# Patient Record
Sex: FEMALE | Race: ASIAN | NOT HISPANIC OR LATINO
[De-identification: names, ages, dates, MRNs, and addresses within clinical notes are randomized per-mention and may not be internally consistent; named-entity substitution may affect disease eponyms.]

---

## 2020-08-06 ENCOUNTER — NON-APPOINTMENT (OUTPATIENT)
Age: 40
End: 2020-08-06

## 2020-08-06 ENCOUNTER — APPOINTMENT (OUTPATIENT)
Dept: OBGYN | Facility: CLINIC | Age: 40
End: 2020-08-06
Payer: MEDICAID

## 2020-08-06 VITALS
WEIGHT: 178 LBS | HEART RATE: 98 BPM | SYSTOLIC BLOOD PRESSURE: 115 MMHG | DIASTOLIC BLOOD PRESSURE: 80 MMHG | BODY MASS INDEX: 32.76 KG/M2 | HEIGHT: 62 IN | TEMPERATURE: 98 F

## 2020-08-06 VITALS
BODY MASS INDEX: 32.76 KG/M2 | TEMPERATURE: 98 F | DIASTOLIC BLOOD PRESSURE: 80 MMHG | HEIGHT: 62 IN | SYSTOLIC BLOOD PRESSURE: 115 MMHG | WEIGHT: 178 LBS

## 2020-08-06 DIAGNOSIS — B18.1 CHRONIC VIRAL HEPATITIS B W/OUT DELTA-AGENT: ICD-10-CM

## 2020-08-06 DIAGNOSIS — Z83.3 FAMILY HISTORY OF DIABETES MELLITUS: ICD-10-CM

## 2020-08-06 DIAGNOSIS — Z80.3 FAMILY HISTORY OF MALIGNANT NEOPLASM OF BREAST: ICD-10-CM

## 2020-08-06 DIAGNOSIS — Z82.49 FAMILY HISTORY OF ISCHEMIC HEART DISEASE AND OTHER DISEASES OF THE CIRCULATORY SYSTEM: ICD-10-CM

## 2020-08-06 DIAGNOSIS — Z87.59 PERSONAL HISTORY OF OTHER COMPLICATIONS OF PREGNANCY, CHILDBIRTH AND THE PUERPERIUM: ICD-10-CM

## 2020-08-06 PROBLEM — Z00.00 ENCOUNTER FOR PREVENTIVE HEALTH EXAMINATION: Status: ACTIVE | Noted: 2020-08-06

## 2020-08-06 PROCEDURE — 0501F PRENATAL FLOW SHEET: CPT

## 2020-08-06 RX ORDER — PNV/FERROUS SULFATE/FOLIC ACID 27-<0.5MG
TABLET ORAL
Refills: 0 | Status: ACTIVE | COMMUNITY

## 2020-08-07 PROBLEM — Z80.3 FAMILY HISTORY OF MALIGNANT NEOPLASM OF BREAST: Status: ACTIVE | Noted: 2020-08-07

## 2020-08-07 PROBLEM — Z82.49 FAMILY HISTORY OF HYPERTENSION: Status: ACTIVE | Noted: 2020-08-07

## 2020-08-07 PROBLEM — Z87.59 HISTORY OF SPONTANEOUS ABORTION: Status: RESOLVED | Noted: 2020-08-07 | Resolved: 2020-08-07

## 2020-08-07 PROBLEM — Z83.3 FAMILY HISTORY OF DIABETES MELLITUS: Status: ACTIVE | Noted: 2020-08-07

## 2020-08-12 LAB
GLUCOSE 1H P 100 G GLC PO SERPL-MCNC: 161 MG/DL
GLUCOSE 2H P CHAL SERPL-MCNC: 146 MG/DL
GLUCOSE 3H P CHAL SERPL-MCNC: 121 MG/DL
GLUCOSE BS SERPL-MCNC: 86 MG/DL

## 2020-08-17 ENCOUNTER — APPOINTMENT (OUTPATIENT)
Dept: OBGYN | Facility: CLINIC | Age: 40
End: 2020-08-17

## 2020-08-20 ENCOUNTER — NON-APPOINTMENT (OUTPATIENT)
Age: 40
End: 2020-08-20

## 2020-08-20 ENCOUNTER — APPOINTMENT (OUTPATIENT)
Dept: OBGYN | Facility: CLINIC | Age: 40
End: 2020-08-20
Payer: MEDICAID

## 2020-08-20 VITALS
DIASTOLIC BLOOD PRESSURE: 72 MMHG | SYSTOLIC BLOOD PRESSURE: 115 MMHG | WEIGHT: 180 LBS | HEIGHT: 62 IN | TEMPERATURE: 98.4 F | BODY MASS INDEX: 33.13 KG/M2

## 2020-08-20 DIAGNOSIS — O09.522 SUPERVISION OF ELDERLY MULTIGRAVIDA, SECOND TRIMESTER: ICD-10-CM

## 2020-08-20 PROCEDURE — 0502F SUBSEQUENT PRENATAL CARE: CPT

## 2020-09-11 ENCOUNTER — ASOB RESULT (OUTPATIENT)
Age: 40
End: 2020-09-11

## 2020-09-11 ENCOUNTER — APPOINTMENT (OUTPATIENT)
Dept: ANTEPARTUM | Facility: CLINIC | Age: 40
End: 2020-09-11
Payer: MEDICAID

## 2020-09-11 PROCEDURE — 76819 FETAL BIOPHYS PROFIL W/O NST: CPT

## 2020-09-11 PROCEDURE — 76817 TRANSVAGINAL US OBSTETRIC: CPT

## 2020-09-11 PROCEDURE — 76811 OB US DETAILED SNGL FETUS: CPT

## 2020-09-14 ENCOUNTER — NON-APPOINTMENT (OUTPATIENT)
Age: 40
End: 2020-09-14

## 2020-09-14 ENCOUNTER — APPOINTMENT (OUTPATIENT)
Dept: OBGYN | Facility: CLINIC | Age: 40
End: 2020-09-14
Payer: MEDICAID

## 2020-09-14 VITALS
SYSTOLIC BLOOD PRESSURE: 124 MMHG | BODY MASS INDEX: 33.77 KG/M2 | DIASTOLIC BLOOD PRESSURE: 79 MMHG | HEIGHT: 62 IN | WEIGHT: 183.5 LBS

## 2020-09-14 DIAGNOSIS — Z23 ENCOUNTER FOR IMMUNIZATION: ICD-10-CM

## 2020-09-14 PROCEDURE — 0502F SUBSEQUENT PRENATAL CARE: CPT

## 2020-09-14 PROCEDURE — 90471 IMMUNIZATION ADMIN: CPT

## 2020-09-14 PROCEDURE — 90715 TDAP VACCINE 7 YRS/> IM: CPT

## 2020-09-28 ENCOUNTER — NON-APPOINTMENT (OUTPATIENT)
Age: 40
End: 2020-09-28

## 2020-09-28 ENCOUNTER — APPOINTMENT (OUTPATIENT)
Dept: OBGYN | Facility: CLINIC | Age: 40
End: 2020-09-28
Payer: MEDICAID

## 2020-09-28 VITALS
DIASTOLIC BLOOD PRESSURE: 78 MMHG | HEIGHT: 62 IN | SYSTOLIC BLOOD PRESSURE: 117 MMHG | BODY MASS INDEX: 34.6 KG/M2 | WEIGHT: 188 LBS

## 2020-09-28 PROCEDURE — 90686 IIV4 VACC NO PRSV 0.5 ML IM: CPT

## 2020-09-28 PROCEDURE — 0502F SUBSEQUENT PRENATAL CARE: CPT

## 2020-09-28 PROCEDURE — 90471 IMMUNIZATION ADMIN: CPT

## 2020-09-28 RX ORDER — MULTIVIT 47/IRON/FOLATE 1/DHA 27-1-300MG
27-0.6-0.4-3 CAPSULE ORAL
Qty: 30 | Refills: 0 | Status: ACTIVE | COMMUNITY
Start: 2020-06-11

## 2020-09-28 RX ORDER — CALCIUM CITRATE, IRON PENTACARBONYL, CHOLECALCIFEROL, .ALPHA.-TOCOPHEROL, DL-, PYRIDOXINE HYDROCHLORIDE, FOLIC ACID, DOCUSATE SODIUM, AND DOCONEXENT 104; 27; 400; 30; 25; 1; 50; 260 MG/1; MG/1; [IU]/1; [IU]/1; MG/1; MG/1; MG/1; MG/1
27-1-260 CAPSULE, GELATIN COATED ORAL
Qty: 30 | Refills: 0 | Status: ACTIVE | COMMUNITY
Start: 2020-04-11

## 2020-09-28 RX ORDER — MULTIVIT 47/IRON/FOLATE 1/DHA 27-1-300MG
27-0.6-0.4-3 CAPSULE ORAL
Qty: 3 | Refills: 3 | Status: ACTIVE | COMMUNITY
Start: 2020-09-28 | End: 1900-01-01

## 2020-10-09 ENCOUNTER — APPOINTMENT (OUTPATIENT)
Dept: ANTEPARTUM | Facility: CLINIC | Age: 40
End: 2020-10-09
Payer: MEDICAID

## 2020-10-09 ENCOUNTER — ASOB RESULT (OUTPATIENT)
Age: 40
End: 2020-10-09

## 2020-10-09 PROCEDURE — 76819 FETAL BIOPHYS PROFIL W/O NST: CPT

## 2020-10-09 PROCEDURE — 76816 OB US FOLLOW-UP PER FETUS: CPT

## 2020-10-15 ENCOUNTER — APPOINTMENT (OUTPATIENT)
Dept: OBGYN | Facility: CLINIC | Age: 40
End: 2020-10-15
Payer: MEDICAID

## 2020-10-15 VITALS
WEIGHT: 191 LBS | DIASTOLIC BLOOD PRESSURE: 76 MMHG | SYSTOLIC BLOOD PRESSURE: 116 MMHG | HEIGHT: 62 IN | BODY MASS INDEX: 35.15 KG/M2

## 2020-10-15 PROCEDURE — 0502F SUBSEQUENT PRENATAL CARE: CPT

## 2020-10-19 ENCOUNTER — APPOINTMENT (OUTPATIENT)
Dept: ANTEPARTUM | Facility: CLINIC | Age: 40
End: 2020-10-19
Payer: MEDICAID

## 2020-10-19 ENCOUNTER — ASOB RESULT (OUTPATIENT)
Age: 40
End: 2020-10-19

## 2020-10-19 ENCOUNTER — OUTPATIENT (OUTPATIENT)
Dept: OUTPATIENT SERVICES | Facility: HOSPITAL | Age: 40
LOS: 1 days | End: 2020-10-19

## 2020-10-19 PROCEDURE — 76818 FETAL BIOPHYS PROFILE W/NST: CPT | Mod: 26

## 2020-10-21 ENCOUNTER — NON-APPOINTMENT (OUTPATIENT)
Age: 40
End: 2020-10-21

## 2020-10-23 ENCOUNTER — APPOINTMENT (OUTPATIENT)
Dept: OBGYN | Facility: CLINIC | Age: 40
End: 2020-10-23
Payer: MEDICAID

## 2020-10-23 ENCOUNTER — NON-APPOINTMENT (OUTPATIENT)
Age: 40
End: 2020-10-23

## 2020-10-23 VITALS
HEIGHT: 62 IN | WEIGHT: 192 LBS | SYSTOLIC BLOOD PRESSURE: 123 MMHG | DIASTOLIC BLOOD PRESSURE: 77 MMHG | BODY MASS INDEX: 35.33 KG/M2

## 2020-10-23 PROCEDURE — 0502F SUBSEQUENT PRENATAL CARE: CPT

## 2020-10-25 LAB
GP B STREP DNA SPEC QL NAA+PROBE: NORMAL
GP B STREP DNA SPEC QL NAA+PROBE: NOT DETECTED
SOURCE GBS: NORMAL

## 2020-10-26 ENCOUNTER — OUTPATIENT (OUTPATIENT)
Dept: OUTPATIENT SERVICES | Facility: HOSPITAL | Age: 40
LOS: 1 days | End: 2020-10-26

## 2020-10-26 ENCOUNTER — APPOINTMENT (OUTPATIENT)
Dept: ANTEPARTUM | Facility: CLINIC | Age: 40
End: 2020-10-26

## 2020-10-26 ENCOUNTER — ASOB RESULT (OUTPATIENT)
Age: 40
End: 2020-10-26

## 2020-10-26 ENCOUNTER — APPOINTMENT (OUTPATIENT)
Dept: ANTEPARTUM | Facility: CLINIC | Age: 40
End: 2020-10-26
Payer: MEDICAID

## 2020-10-26 PROCEDURE — 76818 FETAL BIOPHYS PROFILE W/NST: CPT | Mod: 26

## 2020-10-27 ENCOUNTER — NON-APPOINTMENT (OUTPATIENT)
Age: 40
End: 2020-10-27

## 2020-10-27 ENCOUNTER — APPOINTMENT (OUTPATIENT)
Dept: OBGYN | Facility: CLINIC | Age: 40
End: 2020-10-27
Payer: MEDICAID

## 2020-10-27 VITALS
DIASTOLIC BLOOD PRESSURE: 71 MMHG | BODY MASS INDEX: 35.51 KG/M2 | SYSTOLIC BLOOD PRESSURE: 109 MMHG | HEIGHT: 62 IN | TEMPERATURE: 98.5 F | WEIGHT: 193 LBS

## 2020-10-27 PROCEDURE — 0502F SUBSEQUENT PRENATAL CARE: CPT

## 2020-10-27 RX ORDER — CHLORHEXIDINE GLUCONATE 4 %
325 (65 FE) LIQUID (ML) TOPICAL
Qty: 30 | Refills: 0 | Status: DISCONTINUED | COMMUNITY
Start: 2020-04-11

## 2020-10-27 RX ORDER — TRIAMCINOLONE ACETONIDE 1 MG/G
0.1 CREAM TOPICAL
Qty: 45 | Refills: 0 | Status: DISCONTINUED | COMMUNITY
Start: 2020-08-27

## 2020-11-02 ENCOUNTER — APPOINTMENT (OUTPATIENT)
Dept: ANTEPARTUM | Facility: CLINIC | Age: 40
End: 2020-11-02
Payer: MEDICAID

## 2020-11-02 ENCOUNTER — ASOB RESULT (OUTPATIENT)
Age: 40
End: 2020-11-02

## 2020-11-02 ENCOUNTER — OUTPATIENT (OUTPATIENT)
Dept: OUTPATIENT SERVICES | Facility: HOSPITAL | Age: 40
LOS: 1 days | End: 2020-11-02

## 2020-11-02 PROCEDURE — 76816 OB US FOLLOW-UP PER FETUS: CPT

## 2020-11-02 PROCEDURE — 76818 FETAL BIOPHYS PROFILE W/NST: CPT | Mod: 26

## 2020-11-02 PROCEDURE — 99072 ADDL SUPL MATRL&STAF TM PHE: CPT

## 2020-11-05 ENCOUNTER — APPOINTMENT (OUTPATIENT)
Dept: OBGYN | Facility: CLINIC | Age: 40
End: 2020-11-05

## 2020-11-12 ENCOUNTER — NON-APPOINTMENT (OUTPATIENT)
Age: 40
End: 2020-11-12

## 2020-11-12 ENCOUNTER — OUTPATIENT (OUTPATIENT)
Dept: OUTPATIENT SERVICES | Facility: HOSPITAL | Age: 40
LOS: 1 days | End: 2020-11-12

## 2020-11-12 ENCOUNTER — ASOB RESULT (OUTPATIENT)
Age: 40
End: 2020-11-12

## 2020-11-12 ENCOUNTER — APPOINTMENT (OUTPATIENT)
Dept: ANTEPARTUM | Facility: CLINIC | Age: 40
End: 2020-11-12

## 2020-11-12 ENCOUNTER — APPOINTMENT (OUTPATIENT)
Dept: OBGYN | Facility: CLINIC | Age: 40
End: 2020-11-12
Payer: MEDICAID

## 2020-11-12 ENCOUNTER — APPOINTMENT (OUTPATIENT)
Dept: ANTEPARTUM | Facility: CLINIC | Age: 40
End: 2020-11-12
Payer: MEDICAID

## 2020-11-12 VITALS
WEIGHT: 198.1 LBS | SYSTOLIC BLOOD PRESSURE: 110 MMHG | HEIGHT: 62 IN | TEMPERATURE: 98.1 F | DIASTOLIC BLOOD PRESSURE: 75 MMHG | BODY MASS INDEX: 36.46 KG/M2

## 2020-11-12 DIAGNOSIS — O09.523 SUPERVISION OF ELDERLY MULTIGRAVIDA, THIRD TRIMESTER: ICD-10-CM

## 2020-11-12 PROCEDURE — 76818 FETAL BIOPHYS PROFILE W/NST: CPT | Mod: 26

## 2020-11-12 PROCEDURE — 0502F SUBSEQUENT PRENATAL CARE: CPT

## 2020-11-13 ENCOUNTER — LABORATORY RESULT (OUTPATIENT)
Age: 40
End: 2020-11-13

## 2020-11-13 ENCOUNTER — APPOINTMENT (OUTPATIENT)
Dept: DISASTER EMERGENCY | Facility: CLINIC | Age: 40
End: 2020-11-13

## 2020-11-16 ENCOUNTER — INPATIENT (INPATIENT)
Facility: HOSPITAL | Age: 40
LOS: 0 days | Discharge: HOME | End: 2020-11-17
Attending: OBSTETRICS & GYNECOLOGY | Admitting: OBSTETRICS & GYNECOLOGY
Payer: MEDICAID

## 2020-11-16 ENCOUNTER — TRANSCRIPTION ENCOUNTER (OUTPATIENT)
Age: 40
End: 2020-11-16

## 2020-11-16 VITALS
HEART RATE: 102 BPM | TEMPERATURE: 98 F | SYSTOLIC BLOOD PRESSURE: 116 MMHG | RESPIRATION RATE: 17 BRPM | DIASTOLIC BLOOD PRESSURE: 74 MMHG

## 2020-11-16 DIAGNOSIS — O26.899 OTHER SPECIFIED PREGNANCY RELATED CONDITIONS, UNSPECIFIED TRIMESTER: ICD-10-CM

## 2020-11-16 DIAGNOSIS — Z3A.00 WEEKS OF GESTATION OF PREGNANCY NOT SPECIFIED: ICD-10-CM

## 2020-11-16 DIAGNOSIS — Z33.2 ENCOUNTER FOR ELECTIVE TERMINATION OF PREGNANCY: Chronic | ICD-10-CM

## 2020-11-16 LAB
ANTIBODY ID 1_1: SIGNIFICANT CHANGE UP
ANTIBODY ID 1_2: SIGNIFICANT CHANGE UP
BASOPHILS # BLD AUTO: 0.02 K/UL — SIGNIFICANT CHANGE UP (ref 0–0.2)
BASOPHILS NFR BLD AUTO: 0.2 % — SIGNIFICANT CHANGE UP (ref 0–2)
BLD GP AB SCN SERPL QL: POSITIVE — SIGNIFICANT CHANGE UP
DAT POLY-SP REAG RBC QL: NEGATIVE — SIGNIFICANT CHANGE UP
EOSINOPHIL # BLD AUTO: 0.03 K/UL — SIGNIFICANT CHANGE UP (ref 0–0.5)
EOSINOPHIL NFR BLD AUTO: 0.2 % — SIGNIFICANT CHANGE UP (ref 0–6)
HCT VFR BLD CALC: 42.2 % — SIGNIFICANT CHANGE UP (ref 34.5–45)
HGB BLD-MCNC: 13.5 G/DL — SIGNIFICANT CHANGE UP (ref 11.5–15.5)
IMM GRANULOCYTES NFR BLD AUTO: 0.7 % — SIGNIFICANT CHANGE UP (ref 0–1.5)
LYMPHOCYTES # BLD AUTO: 1.07 K/UL — SIGNIFICANT CHANGE UP (ref 1–3.3)
LYMPHOCYTES # BLD AUTO: 8.3 % — LOW (ref 13–44)
MCHC RBC-ENTMCNC: 30.5 PG — SIGNIFICANT CHANGE UP (ref 27–34)
MCHC RBC-ENTMCNC: 32 % — SIGNIFICANT CHANGE UP (ref 32–36)
MCV RBC AUTO: 95.3 FL — SIGNIFICANT CHANGE UP (ref 80–100)
MONOCYTES # BLD AUTO: 0.83 K/UL — SIGNIFICANT CHANGE UP (ref 0–0.9)
MONOCYTES NFR BLD AUTO: 6.5 % — SIGNIFICANT CHANGE UP (ref 2–14)
NEUTROPHILS # BLD AUTO: 10.81 K/UL — HIGH (ref 1.8–7.4)
NEUTROPHILS NFR BLD AUTO: 84.1 % — HIGH (ref 43–77)
NRBC # FLD: 0 K/UL — SIGNIFICANT CHANGE UP (ref 0–0)
PLATELET # BLD AUTO: 201 K/UL — SIGNIFICANT CHANGE UP (ref 150–400)
PMV BLD: 11.6 FL — SIGNIFICANT CHANGE UP (ref 7–13)
RBC # BLD: 4.43 M/UL — SIGNIFICANT CHANGE UP (ref 3.8–5.2)
RBC # FLD: 14.2 % — SIGNIFICANT CHANGE UP (ref 10.3–14.5)
RH IG SCN BLD-IMP: POSITIVE — SIGNIFICANT CHANGE UP
RH IG SCN BLD-IMP: POSITIVE — SIGNIFICANT CHANGE UP
T PALLIDUM AB TITR SER: NEGATIVE — SIGNIFICANT CHANGE UP
WBC # BLD: 12.85 K/UL — HIGH (ref 3.8–10.5)
WBC # FLD AUTO: 12.85 K/UL — HIGH (ref 3.8–10.5)

## 2020-11-16 PROCEDURE — 59410 OBSTETRICAL CARE: CPT | Mod: U9,UB,GC

## 2020-11-16 PROCEDURE — 86077 PHYS BLOOD BANK SERV XMATCH: CPT

## 2020-11-16 RX ORDER — HYDROCORTISONE 1 %
1 OINTMENT (GRAM) TOPICAL EVERY 6 HOURS
Refills: 0 | Status: DISCONTINUED | OUTPATIENT
Start: 2020-11-16 | End: 2020-11-17

## 2020-11-16 RX ORDER — IBUPROFEN 200 MG
1 TABLET ORAL
Qty: 0 | Refills: 0 | DISCHARGE
Start: 2020-11-16

## 2020-11-16 RX ORDER — MAGNESIUM HYDROXIDE 400 MG/1
30 TABLET, CHEWABLE ORAL
Refills: 0 | Status: DISCONTINUED | OUTPATIENT
Start: 2020-11-16 | End: 2020-11-17

## 2020-11-16 RX ORDER — AER TRAVELER 0.5 G/1
1 SOLUTION RECTAL; TOPICAL EVERY 4 HOURS
Refills: 0 | Status: DISCONTINUED | OUTPATIENT
Start: 2020-11-16 | End: 2020-11-17

## 2020-11-16 RX ORDER — TETANUS TOXOID, REDUCED DIPHTHERIA TOXOID AND ACELLULAR PERTUSSIS VACCINE, ADSORBED 5; 2.5; 8; 8; 2.5 [IU]/.5ML; [IU]/.5ML; UG/.5ML; UG/.5ML; UG/.5ML
0.5 SUSPENSION INTRAMUSCULAR ONCE
Refills: 0 | Status: DISCONTINUED | OUTPATIENT
Start: 2020-11-16 | End: 2020-11-17

## 2020-11-16 RX ORDER — IBUPROFEN 200 MG
600 TABLET ORAL EVERY 6 HOURS
Refills: 0 | Status: COMPLETED | OUTPATIENT
Start: 2020-11-16 | End: 2021-10-15

## 2020-11-16 RX ORDER — OXYCODONE HYDROCHLORIDE 5 MG/1
5 TABLET ORAL
Refills: 0 | Status: DISCONTINUED | OUTPATIENT
Start: 2020-11-16 | End: 2020-11-17

## 2020-11-16 RX ORDER — IBUPROFEN 200 MG
600 TABLET ORAL EVERY 6 HOURS
Refills: 0 | Status: DISCONTINUED | OUTPATIENT
Start: 2020-11-16 | End: 2020-11-17

## 2020-11-16 RX ORDER — OXYTOCIN 10 UNIT/ML
333.33 VIAL (ML) INJECTION
Qty: 20 | Refills: 0 | Status: DISCONTINUED | OUTPATIENT
Start: 2020-11-16 | End: 2020-11-16

## 2020-11-16 RX ORDER — PRAMOXINE HYDROCHLORIDE 150 MG/15G
1 AEROSOL, FOAM RECTAL EVERY 4 HOURS
Refills: 0 | Status: DISCONTINUED | OUTPATIENT
Start: 2020-11-16 | End: 2020-11-17

## 2020-11-16 RX ORDER — BENZOCAINE 10 %
1 GEL (GRAM) MUCOUS MEMBRANE EVERY 6 HOURS
Refills: 0 | Status: DISCONTINUED | OUTPATIENT
Start: 2020-11-16 | End: 2020-11-17

## 2020-11-16 RX ORDER — SODIUM CHLORIDE 9 MG/ML
1000 INJECTION, SOLUTION INTRAVENOUS
Refills: 0 | Status: DISCONTINUED | OUTPATIENT
Start: 2020-11-16 | End: 2020-11-16

## 2020-11-16 RX ORDER — KETOROLAC TROMETHAMINE 30 MG/ML
30 SYRINGE (ML) INJECTION ONCE
Refills: 0 | Status: DISCONTINUED | OUTPATIENT
Start: 2020-11-16 | End: 2020-11-16

## 2020-11-16 RX ORDER — DIPHENHYDRAMINE HCL 50 MG
25 CAPSULE ORAL EVERY 6 HOURS
Refills: 0 | Status: DISCONTINUED | OUTPATIENT
Start: 2020-11-16 | End: 2020-11-17

## 2020-11-16 RX ORDER — SODIUM CHLORIDE 9 MG/ML
3 INJECTION INTRAMUSCULAR; INTRAVENOUS; SUBCUTANEOUS EVERY 8 HOURS
Refills: 0 | Status: DISCONTINUED | OUTPATIENT
Start: 2020-11-16 | End: 2020-11-17

## 2020-11-16 RX ORDER — OXYCODONE HYDROCHLORIDE 5 MG/1
5 TABLET ORAL ONCE
Refills: 0 | Status: DISCONTINUED | OUTPATIENT
Start: 2020-11-16 | End: 2020-11-17

## 2020-11-16 RX ORDER — OXYTOCIN 10 UNIT/ML
333.33 VIAL (ML) INJECTION
Qty: 20 | Refills: 0 | Status: DISCONTINUED | OUTPATIENT
Start: 2020-11-16 | End: 2020-11-17

## 2020-11-16 RX ORDER — SIMETHICONE 80 MG/1
80 TABLET, CHEWABLE ORAL EVERY 4 HOURS
Refills: 0 | Status: DISCONTINUED | OUTPATIENT
Start: 2020-11-16 | End: 2020-11-17

## 2020-11-16 RX ORDER — DIBUCAINE 1 %
1 OINTMENT (GRAM) RECTAL EVERY 6 HOURS
Refills: 0 | Status: DISCONTINUED | OUTPATIENT
Start: 2020-11-16 | End: 2020-11-17

## 2020-11-16 RX ORDER — ACETAMINOPHEN 500 MG
3 TABLET ORAL
Qty: 0 | Refills: 0 | DISCHARGE
Start: 2020-11-16

## 2020-11-16 RX ORDER — ACETAMINOPHEN 500 MG
975 TABLET ORAL
Refills: 0 | Status: DISCONTINUED | OUTPATIENT
Start: 2020-11-16 | End: 2020-11-17

## 2020-11-16 RX ORDER — CITRIC ACID/SODIUM CITRATE 300-500 MG
15 SOLUTION, ORAL ORAL EVERY 6 HOURS
Refills: 0 | Status: DISCONTINUED | OUTPATIENT
Start: 2020-11-16 | End: 2020-11-16

## 2020-11-16 RX ORDER — LANOLIN
1 OINTMENT (GRAM) TOPICAL EVERY 6 HOURS
Refills: 0 | Status: DISCONTINUED | OUTPATIENT
Start: 2020-11-16 | End: 2020-11-17

## 2020-11-16 RX ADMIN — SODIUM CHLORIDE 3 MILLILITER(S): 9 INJECTION INTRAMUSCULAR; INTRAVENOUS; SUBCUTANEOUS at 21:32

## 2020-11-16 RX ADMIN — Medication 30 MILLIGRAM(S): at 14:56

## 2020-11-16 RX ADMIN — Medication 1000 MILLIUNIT(S)/MIN: at 13:37

## 2020-11-16 RX ADMIN — Medication 975 MILLIGRAM(S): at 22:10

## 2020-11-16 RX ADMIN — Medication 975 MILLIGRAM(S): at 21:30

## 2020-11-16 NOTE — OB PROVIDER H&P - NS_OBGYNHISTORY_OBGYN_ALL_OB_FT
2000, TOP at 16 weeks  10/2002, TOP at 4 weeks,  2004, , [id number], uncomplicated  2017, SAB at 12 weeks  9/15/2018, , [id number], uncomplicated

## 2020-11-16 NOTE — OB RN DELIVERY SUMMARY - NS_SEPSISRSKCALC_OBGYN_ALL_OB_FT
EOS calculated successfully. EOS Risk Factor: 0.5/1000 live births ([facility] national incidence); GA=40w;Temp=98.42; ROM=0.4; GBS='Negative'; Antibiotics='No antibiotics or any antibiotics < 2 hrs prior to birth'

## 2020-11-16 NOTE — OB PROVIDER H&P - ASSESSMENT
39 yo , EGA@40 weeks presented with c/o of mild irregular contractions, bloody show. Patient reports  +fetal movements. denies leaking of fluid. Pt denies any other concerns.  – Prenatal course is significant for  AMA, amniocentesis done: normal 46XX; abnormal GCT with normal GTT.  GBS negative;    – OB hx: 2000, TOP at 16 weeks              10/2002, TOP at 4 weeks,              2004, , [id number], uncomplicated              2017, SAB at 12 weeks              9/15/2018, , [id number], uncomplicated  – GYN hx: denies hx STIs, fibroids, polyps, cysts  – Med hx: Hepatitis B carrier; denies hx clotting or bleeding disorders, HTN, DM  – Surg hx: denies  - Family hx: mother with HTN, Diabetes, breat Ca; no hx congential disorders, bleeding/clotting disorders  – Psych hx: denies   – Social hx: denies ETOH, smoking, drugs. Safe at home/in relationship.  – Meds: PNV   – Allergies: NKDA  – Will accept blood transfusions? Yes    Objective  Vital Signs: T(C): 36.9 (2020 06:51), Max: 36.9 (2020 06:47)  HR: 111 (2020 07:29) (102 - 111); BP: 112/71 (2020 07:29) (112/71 - 116/74)  RR: 17 (2020 06:47)     – PE:   CV: RRR  Pulm: breathing comfortably on RA  Abd: gravid, nontender  Extr: moving all extremities with ease    SVE: 3/80/-2, mild vloody show  – FHT: baseline 150, mod variability, +accels, -decels  – [place]: irregular mild contractions  – EFW by Leopolds: 8;bs8oz    A; 39 yo , EGA@40 weeks, early labor vs false labor; Hepatitis carrier  P: Case was reviewed with Dr. Guan, was advised to admit for epidural; anticipate .    Kamini Brown CNM

## 2020-11-16 NOTE — OB PROVIDER TRIAGE NOTE - NS_OBGYNHISTORY_OBGYN_ALL_OB_FT
2000, TOP at 16 weeks  10/2002, TOP at 4 weeks,  2004, , [id number], uncomplicated  2017, SAB at 12 weeks  9/15/2018, , [id number], uncomplicated

## 2020-11-16 NOTE — OB PROVIDER DELIVERY SUMMARY - NSPROVIDERDELIVERYNOTE_OBGYN_ALL_OB_FT
Spontaneous vaginal delivery of liveborn infant from OA position. Head, shoulders, and body delivered easily. Infant was suctioned. Terminal mec noted. 1 minute delayed cord clamping was performed. Cord clamped and cut and infant passed to mother. Placenta extracted manually. Fundal massage was given and uterine fundus was found to be firm. Vaginal exam revealed an intact cervix, vaginal walls, and sulci. Patient had a 1st degree laceration in the perineum that was repaired with 2.0 chromic suture. Excellent hemostasis was noted. Patient was stable and went to recovery. Count was correct x2.    Ria Dockery MD PGY1

## 2020-11-16 NOTE — DISCHARGE NOTE OB - CARE PROVIDER_API CALL
Hiral Chau J  OBSTETRICS AND GYNECOLOGY  [address] [place] [address]  Phone: [phone]  Fax: [phone]  Follow Up Time:

## 2020-11-16 NOTE — DISCHARGE NOTE OB - MATERIALS PROVIDED
Shaken Baby Prevention Handout/Breastfeeding Guide and Packet/Birth Certificate Instructions/Breastfeeding Mother’s Support Group Information/[facility] Hearing Screen Program/[place]  Immunization Record/Back To Sleep Handout/Breastfeeding Log/Guide to Postpartum Care/[facility]  Screening Program/Vaccinations/Tdap Vaccination (VIS Pub Date: 2012)

## 2020-11-16 NOTE — OB PROVIDER TRIAGE NOTE - NSOBPROVIDERNOTE_OBGYN_ALL_OB_FT
39 yo , EGA@40 weeks presented with c/o of mild irregular contractions, bloody show. Patient reports  +fetal movements. denies leaking of fluid. Pt denies any other concerns.  – Prenatal course is significant for  AMA, amniocentesis done: normal 46XX; abnormal GCT with normal GTT.  GBS negative;    – OB hx: 2000, TOP at 16 weeks              10/2002, TOP at 4 weeks,              2004, , [id number], uncomplicated              2017, SAB at 12 weeks              9/15/2018, , [id number], uncomplicated  – GYN hx: denies hx STIs, fibroids, polyps, cysts  – Med hx: Hepatitis B carrier; denies hx clotting or bleeding disorders, HTN, DM  – Surg hx: denies  - Family hx: mother with HTN, Diabetes, breat Ca; no hx congential disorders, bleeding/clotting disorders  – Psych hx: denies   – Social hx: denies ETOH, smoking, drugs. Safe at home/in relationship.  – Meds: PNV   – Allergies: NKDA  – Will accept blood transfusions? Yes    Objective  Vital Signs: T(C): 36.9 (2020 06:51), Max: 36.9 (2020 06:47)  HR: 111 (2020 07:29) (102 - 111); BP: 112/71 (2020 07:29) (112/71 - 116/74)  RR: 17 (2020 06:47)     – PE:   CV: RRR  Pulm: breathing comfortably on RA  Abd: gravid, nontender  Extr: moving all extremities with ease    SVE: 3/80/-2, mild vloody show  – FHT: baseline 150, mod variability, +accels, -decels  – [place]: irregular mild contractions  – EFW by Leop[place]: 9 lbs    A; 39 yo , EGA@40 weeks, early labor vs false labor; Hepatitis carrier  P: will discuss will PMD regarding further plan of care.    Kamini Brown CNM 41 yo , EGA@40 weeks presented with c/o of mild irregular contractions, bloody show. Patient reports  +fetal movements. denies leaking of fluid. Pt denies any other concerns.  – Prenatal course is significant for  AMA, amniocentesis done: normal 46XX; abnormal GCT with normal GTT.  GBS negative;    – OB hx: 2000, TOP at 16 weeks              10/2002, TOP at 4 weeks,              2004, , [id number], uncomplicated              2017, SAB at 12 weeks              9/15/2018, , [id number], uncomplicated  – GYN hx: denies hx STIs, fibroids, polyps, cysts  – Med hx: Hepatitis B carrier; denies hx clotting or bleeding disorders, HTN, DM  – Surg hx: denies  - Family hx: mother with HTN, Diabetes, breat Ca; no hx congential disorders, bleeding/clotting disorders  – Psych hx: denies   – Social hx: denies ETOH, smoking, drugs. Safe at home/in relationship.  – Meds: PNV   – Allergies: NKDA  – Will accept blood transfusions? Yes    Objective  Vital Signs: T(C): 36.9 (2020 06:51), Max: 36.9 (2020 06:47)  HR: 111 (2020 07:29) (102 - 111); BP: 112/71 (2020 07:29) (112/71 - 116/74)  RR: 17 (2020 06:47)     – PE:   CV: RRR  Pulm: breathing comfortably on RA  Abd: gravid, nontender  Extr: moving all extremities with ease    SVE: 3/80/-2, mild vloody show  – FHT: baseline 150, mod variability, +accels, -decels  – [place]: irregular mild contractions  – EFW by Leop[place]: 8;bs8oz    A; 41 yo , EGA@40 weeks, early labor vs false labor; Hepatitis carrier  P: will discuss will PMD regarding further plan of care.  Case was reviewed with Dr. Guan, was advised to admit for epidural; anticipate .    Kamini Brown CNM

## 2020-11-16 NOTE — OB RN TRIAGE NOTE - PSH
No significant past surgical history Termination of pregnancy (fetus)  x 1   Termination of pregnancy (fetus)  x 2

## 2020-11-16 NOTE — DISCHARGE NOTE OB - CARE PROVIDERS DIRECT ADDRESSES
[email or web address]

## 2020-11-16 NOTE — OB PROVIDER TRIAGE NOTE - FAMILY HISTORY
Mother  Still living? Unknown  Family history of diabetes mellitus, Age at diagnosis: Age Unknown  Family history of breast cancer, Age at diagnosis: Age Unknown  Family history of essential hypertension, Age at diagnosis: Age Unknown

## 2020-11-16 NOTE — DISCHARGE NOTE OB - HOSPITAL COURSE
39 y/o P2 @ 40wks admitted in labor, delivered viable female infant via , postpartum course uncomplicated

## 2020-11-16 NOTE — OB RN TRIAGE NOTE - PMH
(normal spontaneous vaginal delivery)  2004 FT M 8lbs#8  11/15/2018 FT F 7#8    (normal spontaneous vaginal delivery)  2004 FT M 8lbs#8  11/15/2018 FT F 7#8  Spontaneous   x 3    (normal spontaneous vaginal delivery)  2004 FT M 8lbs#8  11/15/2018 FT F 7#8  Spontaneous   x 1   Hepatitis  hepatitis carrier   (normal spontaneous vaginal delivery)  2004 FT M 8lbs#8  11/15/2018 FT F 7#8  Spontaneous   x 1

## 2020-11-16 NOTE — PROVIDER CONTACT NOTE (OTHER) - ASSESSMENT
See flowsheet for vitals.  standing during orthostatics. Pt is asymptomatic. pt denies sob, chest pain, dizziness, and or palpitations. Lungs clear to auscultation, moderate lochia, no calf tenderness. Pt states, "I have not been drinking any water." Pt denies c/o pain.

## 2020-11-16 NOTE — DISCHARGE NOTE OB - PATIENT PORTAL LINK FT
You can access the FollowMyHealth Patient Portal offered by Health system by registering at the following website: [email or web address]. By joining [facility]’s FollowMyHealth portal, you will also be able to view your health information using other applications (apps) compatible with our system.

## 2020-11-16 NOTE — DISCHARGE NOTE OB - CARE PLAN
Principal Discharge DX:	 (normal spontaneous vaginal delivery)  Goal:	recovery  Assessment and plan of treatment:	no change

## 2020-11-16 NOTE — OB RN TRIAGE NOTE - NS PRO ABUSE SCREEN AFRAID ANYONE YN
LVMM for patient that FNA of neck nodule was non diagnostic and Dr. Mara Pacheco has recommended a CT STN be completed. Informed patient this test requires IV contrast and he will need premeds with Benadryl and prednisone d/t his contrast allergy.  Request patient
no

## 2020-11-17 VITALS
TEMPERATURE: 98 F | SYSTOLIC BLOOD PRESSURE: 118 MMHG | RESPIRATION RATE: 18 BRPM | DIASTOLIC BLOOD PRESSURE: 72 MMHG | OXYGEN SATURATION: 98 % | HEART RATE: 100 BPM

## 2020-11-17 RX ADMIN — Medication 975 MILLIGRAM(S): at 02:33

## 2020-11-17 RX ADMIN — Medication 600 MILLIGRAM(S): at 06:00

## 2020-11-17 RX ADMIN — Medication 600 MILLIGRAM(S): at 01:50

## 2020-11-17 RX ADMIN — Medication 975 MILLIGRAM(S): at 03:30

## 2020-11-17 RX ADMIN — SODIUM CHLORIDE 3 MILLILITER(S): 9 INJECTION INTRAMUSCULAR; INTRAVENOUS; SUBCUTANEOUS at 05:13

## 2020-11-17 RX ADMIN — Medication 600 MILLIGRAM(S): at 05:14

## 2020-11-17 RX ADMIN — Medication 600 MILLIGRAM(S): at 00:51

## 2020-11-17 NOTE — PROGRESS NOTE ADULT - ATTENDING COMMENTS
saw and examined patient  doing well  no complaints  for discharge today  reviewed discharge instructions

## 2020-11-17 NOTE — PROGRESS NOTE ADULT - SUBJECTIVE AND OBJECTIVE BOX
OB Progress Note:  PPD#1    S: 41yo  PPD#1 s/p  with 1st degree laceration. .  OBHx: AMA.  PMH: significant for Hepatitis B (no medications per patient).  Patient feels well. Pain is well controlled. She is tolerating a regular diet and passing flatus. She is voiding spontaneously, and ambulating without difficulty. Denies CP/SOB. Denies lightheadedness/dizziness. Denies N/V.    O:  Vitals:  Vital Signs Last 24 Hrs  T(C): 36.6 (2020 05:23), Max: 37.1 (2020 22:34)  T(F): 97.9 (2020 05:23), Max: 98.8 (2020 22:34)  HR: 100 (2020 05:28) (78 - 114)  BP: 105/68 (2020 05:23) (98/62 - 142/86)  BP(mean): --  RR: 18 (2020 05:23) (17 - 20)  SpO2: 98% (2020 05:23) (92% - 100%)    MEDICATIONS  (STANDING):  acetaminophen   Tablet .. 975 milliGRAM(s) Oral <User Schedule>  diphtheria/tetanus/pertussis (acellular) Vaccine (ADAcel) 0.5 milliLiter(s) IntraMuscular once  ibuprofen  Tablet. 600 milliGRAM(s) Oral every 6 hours  prenatal multivitamin 1 Tablet(s) Oral daily  sodium chloride 0.9% lock flush 3 milliLiter(s) IV Push every 8 hours      Labs:  Blood type: O Positive  Rubella IgG: RPR: Negative                          13.5   12.85<H> >-----------< 201    ( 11-16 @ 08:30 )             42.2    Physical Exam:  General: NAD  Abdomen: soft, non-tender, non-distended, fundus firm  Vaginal: Lochia wnl  Extremities: No erythema/edema    A/P: 41yo PPD#1 s/p .  Patient is stable and doing well post-partum.   - Pain well controlled, continue current pain regimen  - Increase ambulation, SCDs when not ambulating  - Continue regular diet  - Discharge planning    -Isela CLARKE

## 2020-11-17 NOTE — PROGRESS NOTE ADULT - SUBJECTIVE AND OBJECTIVE BOX
POD#1 S/P Vaginal Delivery with epidural anesthesia    Patient is doing well.  OOBAA. Tolerating clears.  Pain is tolerable.  No residual anesthetic issues or complications noted.    Jocelyn Roth CRNA

## 2020-11-19 ENCOUNTER — APPOINTMENT (OUTPATIENT)
Dept: OBGYN | Facility: CLINIC | Age: 40
End: 2020-11-19

## 2020-11-19 PROBLEM — O03.9 COMPLETE OR UNSPECIFIED SPONTANEOUS ABORTION WITHOUT COMPLICATION: Chronic | Status: ACTIVE | Noted: 2020-11-16

## 2020-11-19 PROBLEM — K75.9 INFLAMMATORY LIVER DISEASE, UNSPECIFIED: Chronic | Status: ACTIVE | Noted: 2020-11-16

## 2020-11-20 ENCOUNTER — APPOINTMENT (OUTPATIENT)
Dept: ANTEPARTUM | Facility: CLINIC | Age: 40
End: 2020-11-20

## 2020-11-23 DIAGNOSIS — O99.213 OBESITY COMPLICATING PREGNANCY, THIRD TRIMESTER: ICD-10-CM

## 2020-11-23 DIAGNOSIS — O09.523 SUPERVISION OF ELDERLY MULTIGRAVIDA, THIRD TRIMESTER: ICD-10-CM

## 2020-11-23 DIAGNOSIS — B18.8 OTHER CHRONIC VIRAL HEPATITIS: ICD-10-CM

## 2020-11-23 DIAGNOSIS — Z3A.36 36 WEEKS GESTATION OF PREGNANCY: ICD-10-CM

## 2020-12-01 DIAGNOSIS — O09.523 SUPERVISION OF ELDERLY MULTIGRAVIDA, THIRD TRIMESTER: ICD-10-CM

## 2020-12-01 DIAGNOSIS — B18.8 OTHER CHRONIC VIRAL HEPATITIS: ICD-10-CM

## 2020-12-01 DIAGNOSIS — O99.213 OBESITY COMPLICATING PREGNANCY, THIRD TRIMESTER: ICD-10-CM

## 2020-12-07 DIAGNOSIS — O09.523 SUPERVISION OF ELDERLY MULTIGRAVIDA, THIRD TRIMESTER: ICD-10-CM

## 2020-12-07 DIAGNOSIS — O99.213 OBESITY COMPLICATING PREGNANCY, THIRD TRIMESTER: ICD-10-CM

## 2020-12-16 DIAGNOSIS — Z3A.39 39 WEEKS GESTATION OF PREGNANCY: ICD-10-CM

## 2020-12-16 DIAGNOSIS — O09.523 SUPERVISION OF ELDERLY MULTIGRAVIDA, THIRD TRIMESTER: ICD-10-CM

## 2020-12-16 DIAGNOSIS — O99.213 OBESITY COMPLICATING PREGNANCY, THIRD TRIMESTER: ICD-10-CM

## 2020-12-22 ENCOUNTER — APPOINTMENT (OUTPATIENT)
Dept: OBGYN | Facility: CLINIC | Age: 40
End: 2020-12-22

## 2024-05-10 NOTE — OB RN PATIENT PROFILE - BREAST MILK PROVIDES COLOSTRUM THAT IS HIGH IN PROTEIN
> Trial omeprazole x 30 days, if no improvement follow-up in 1 month  > If no improvement consider H. pylori testing  > Discussed dietary changes including avoidance of alcohol, no eating 3 hours prior to sleeping, avoidance of high acidic foods such as citrus fruits and tomatoes   Statement Selected

## 2024-10-15 NOTE — OB PROVIDER TRIAGE NOTE - NSPREVIOUSSPONTANEOUSTERMINATIONSMORE20_OBGYN_ALL_OB_NU
Writer called patient's father and faxed orders to [facility]. Patient's father verbalized understanding.   
No
0